# Patient Record
Sex: FEMALE | Race: WHITE | Employment: OTHER | URBAN - METROPOLITAN AREA
[De-identification: names, ages, dates, MRNs, and addresses within clinical notes are randomized per-mention and may not be internally consistent; named-entity substitution may affect disease eponyms.]

---

## 2017-04-21 ENCOUNTER — OFFICE VISIT (OUTPATIENT)
Dept: CARDIOLOGY CLINIC | Age: 72
End: 2017-04-21

## 2017-04-21 VITALS
WEIGHT: 161.2 LBS | OXYGEN SATURATION: 98 % | HEIGHT: 60 IN | DIASTOLIC BLOOD PRESSURE: 80 MMHG | BODY MASS INDEX: 31.65 KG/M2 | SYSTOLIC BLOOD PRESSURE: 132 MMHG | HEART RATE: 84 BPM

## 2017-04-21 DIAGNOSIS — R94.31 ABNORMAL EKG: Primary | ICD-10-CM

## 2017-04-21 RX ORDER — GLUCOSAMINE HCL 500 MG
TABLET ORAL
COMMUNITY

## 2017-04-21 RX ORDER — PRAVASTATIN SODIUM 20 MG/1
20 TABLET ORAL
COMMUNITY

## 2017-04-21 NOTE — PROGRESS NOTES
Herbert Davis, [address]  [address] Geoffrey Barrett, [facility], [address] [facility]    Office [phone]  Fax [phone]  Cell [phone]        Dana Marsh is a 80 y.o. female. Last seen 1 year ago by Dr. Gaye Yañez. Assessment  Encounter Diagnosis   Name Primary?  Abnormal EKG Yes     Recommendations: Dana Marsh has poor R-wave progression on EKG, normal variant, unchanged from 1 year ago - no evidence of MI. Echo last year demonstrated normal regional and global LV function. She was reassured. She has no exertional sxs at this time. Nevertheless, she is at intermediate CAD risk. We discussed the role of CT heart scan to evaluate the role for subclinical CAD. She is already on Aspirin therapy. Phone follow up after reviewing tests. Subjective:    Mrs. Gelacio Cha presents to the clinic following abnormal EKG from Dr. Haley Banks office - Maida Lopez, old anterior MI per computer interpretation. Reports few episodes of dizziness with pollen exposure but no syncopal or near syncopal episodes. She remains active walking 3 miles day. Denies any exertional symptoms. She follows a gluten-free and sugar-free diet. Patient denies any exertional chest pain, dyspnea, palpitations, syncope, orthopnea, edema or paroxysmal nocturnal dyspnea. Cardiac risk factors   HTN no  DM no  Smoking no  Family hx of CAD yes - Father with MI in his 62s. Cardiac testing  No specialty comments available. Past Medical History:   Diagnosis Date    HLD (hyperlipidemia)     HTN (hypertension)     Vitamin D deficiency         Current Outpatient Prescriptions   Medication Sig Dispense Refill    pravastatin (PRAVACHOL) 20 mg tablet Take 20 mg by mouth nightly.  Cholecalciferol, Vitamin D3, 3,000 unit tab Take  by mouth.  Cetirizine 10 mg cap Take  by mouth.  aspirin 81 mg chewable tablet Take 1 Tab by mouth daily.  30 Tab 0       No Known Allergies       Review of Systems  Constitutional: Negative for fever, chills, malaise/fatigue and diaphoresis. Respiratory: Negative for cough, hemoptysis, sputum production, shortness of breath and wheezing. Cardiovascular: Negative for chest pain, palpitations, orthopnea, claudication, leg swelling and PND. Gastrointestinal: Negative for heartburn, nausea, vomiting, blood in stool and melena. Genitourinary: Negative for dysuria and flank pain. Musculoskeletal: Negative for joint pain and back pain. Skin: Negative for rash. Neurological: Negative for focal weakness, seizures, loss of consciousness, weakness and headaches. Positive for dizziness. Endo/Heme/Allergies: Does not bruise/bleed easily. Psychiatric/Behavioral: Negative for memory loss. The patient does not have insomnia. Physical Exam    Visit Vitals    /80 (BP 1 Location: Left arm, BP Patient Position: Sitting)    Pulse 84    Ht 5' (1.524 m)    Wt 161 lb 3.2 oz (73.1 kg)    SpO2 98%    BMI 31.48 kg/m2     Wt Readings from Last 3 Encounters:   04/21/17 161 lb 3.2 oz (73.1 kg)   04/29/16 160 lb (72.6 kg)   04/25/16 157 lb (71.2 kg)      General - well developed well nourished  Neck - JVP normal, thyroid nl  Cardiac - normal S1, S2, no murmurs, rubs or gallops. No clicks  Vascular - carotids without bruits, radials, femorals and pedal pulses equal bilateral  Lungs - clear to auscultation bilaterals, no rales, wheezing or rhonchi  Abd - soft nontender, no HSM, no abd bruits  Extremities - no edema  Skin - no rash  Neuro - nonfocal  Psych - normal mood and affect      Cardiographics    Echo 05/06/16- Mild LVH, EF 65%  EKG 04/06/17- SR, poor R-wave progression, probably normal unchanged from 04/25/16.    Lab work 04/07/17- , TG 52, HDL 64, IRT176, TSH 4.53, GLU 92    Written by Walt Calderon, as dictated by Yahaira Banda MD.   Yahaira Banda MD

## 2017-04-21 NOTE — MR AVS SNAPSHOT
Visit Information Date & Time Provider Department Dept. Phone Encounter #  
 4/21/2017  3:40 PM Andrea Fuller MD [facility] Meredith Ochoa [phone] [id number] Follow-up Instructions Return if symptoms worsen or fail to improve. Upcoming Health Maintenance Date Due Hepatitis C Screening 1945 DTaP/Tdap/Td series (1 - Tdap) 10/6/1967 FOBT Q 1 YEAR AGE 50-75 10/6/1996 ZOSTER VACCINE AGE 60> 10/6/2006 GLAUCOMA SCREENING Q2Y 10/6/2011 OSTEOPOROSIS SCREENING (DEXA) 10/6/2011 Pneumococcal 65+ Low/Medium Risk (1 of 2 - PCV13) 10/6/2011 MEDICARE YEARLY EXAM 10/6/2011 BREAST CANCER SCRN MAMMOGRAM 3/27/2015 INFLUENZA AGE 9 TO ADULT 8/1/2016 Allergies as of 4/21/2017  Review Complete On: 4/21/2017 By: Alisha Harper  
 No Known Allergies Current Immunizations  Never Reviewed No immunizations on file. Not reviewed this visit You Were Diagnosed With   
  
 Codes Comments Abnormal EKG    -  Primary ICD-10-CM: R94.31 
ICD-9-CM: 794.31 Vitals BP Pulse Height(growth percentile) Weight(growth percentile) SpO2 BMI  
 132/80 (BP 1 Location: Left arm, BP Patient Position: Sitting) 84 5' (1.524 m) 161 lb 3.2 oz (73.1 kg) 98% 31.48 kg/m2 OB Status Smoking Status Menopause Never Smoker Vitals History BMI and BSA Data Body Mass Index Body Surface Area  
 31.48 kg/m 2 1.76 m 2 Preferred Pharmacy Pharmacy Name Phone [facility] [address] [place] [address] [phone] Your Updated Medication List  
  
   
This list is accurate as of: 4/21/17  3:58 PM.  Always use your most recent med list.  
  
  
  
  
 aspirin 81 mg chewable tablet Take 1 Tab by mouth daily. Cetirizine 10 mg Cap Take  by mouth. Cholecalciferol (Vitamin D3) 3,000 unit Tab Take  by mouth.  
  
 pravastatin 20 mg tablet Commonly known as:  PRAVACHOL  
 Take 20 mg by mouth nightly. Follow-up Instructions Return if symptoms worsen or fail to improve. Patient Instructions I would like to schedule a CT heart scan to look for heart artery plaque. Call [phone] to schedule this procedure. Insurance does not cover this, but it is only $100. Introducing [facility] & HEALTH SERVICES! Mayra Agee introduces [employer] patient portal. Now you can access parts of your medical record, email your doctor's office, and request medication refills online. 1. In your internet browser, go to [email or web address] [email or web address] 2. Click on the First Time User? Click Here link in the Sign In box. You will see the New Member Sign Up page. 3. Enter your [employer] Access Code exactly as it appears below. You will not need to use this code after youve completed the sign-up process. If you do not sign up before the expiration date, you must request a new code. · [employer] Access Code: [email or web address] Expires: 7/20/2017  3:58 PM 
 
4. Enter the last four digits of your Social Security Number (xxxx) and Date of Birth (mm/dd/yyyy) as indicated and click Submit. You will be taken to the next sign-up page. 5. Create a [employer] ID. This will be your [employer] login ID and cannot be changed, so think of one that is secure and easy to remember. 6. Create a [employer] password. You can change your password at any time. 7. Enter your Password Reset Question and Answer. This can be used at a later time if you forget your password. 8. Enter your e-mail address. You will receive e-mail notification when new information is available in [address] 9. Click Sign Up. You can now view and download portions of your medical record. 10. Click the Download Summary menu link to download a portable copy of your medical information. If you have questions, please visit the Frequently Asked Questions section of the [employer] website.  Remember, [employer] is NOT to be used for urgent needs. For medical emergencies, dial 911. Now available from your iPhone and Android! Please provide this summary of care documentation to your next provider. Your primary care clinician is listed as Rashid Damico. If you have any questions after today's visit, please call [phone].

## 2017-04-21 NOTE — PROGRESS NOTES
Visit Vitals    /80 (BP 1 Location: Left arm, BP Patient Position: Sitting)    Pulse 84    Ht 5' (1.524 m)    Wt 161 lb 3.2 oz (73.1 kg)    SpO2 98%    BMI 31.48 kg/m2

## 2017-04-21 NOTE — PATIENT INSTRUCTIONS
I would like to schedule a CT heart scan to look for heart artery plaque. Call [phone] to schedule this procedure. Insurance does not cover this, but it is only $100.

## 2017-04-24 ENCOUNTER — TELEPHONE (OUTPATIENT)
Dept: CARDIOLOGY CLINIC | Age: 72
End: 2017-04-24

## 2017-04-24 NOTE — TELEPHONE ENCOUNTER
Patient notified that compared to her previous EKG, there has been no changes. Reiterated that there is no evidence of MI per Dr. Kenya Garcia. She will have CT heart scan done once she returns from [place].

## 2018-07-11 NOTE — PERIOP NOTES
[address]  Endoscopy Preprocedure Instructions      1. On the day of your surgery, please report to registration located on the 2nd floor of the  [facility]. yes    2. You must have a responsible adult to drive you to the hospital, stay at the hospital during your procedure and drive you home. If they leave your procedure will not be started (no exceptions). yes and no    3. Do not have anything to eat or drink (including water, gum, mints, coffee, and juice) after midnight. This does not apply to the medications you were instructed to take by your physician. yesIf you are currently taking Plavix, Coumadin, Aspirin, or other blood-thinning agents, contact your physician for special instructions. yes,aspirin    4. If you are having a procedure that requires bowel prep: We recommend that you have only clear liquids the day before your procedure and begin your bowel prep by 5:00 pm.  You may continue to drink clear liquids until midnight. If for any reason you are not able to complete your prep please notify your physician immediately. yes    5. Have a list of all current medications, including vitamins, herbal supplements and any other over the counter medications. yes    6. If you wear glasses, contacts, dentures and/or hearing aids, they may be removed prior to procedure, please bring a case to store them in. yes    7. You should understand that if you do not follow these instructions your procedure may be cancelled. If your physical condition changes (I.e. fever, cold or flu) please contact your doctor as soon as possible. 8. It is important that you be on time.   If for any reason you are unable to keep your appointment please call )- the day of or your physicians office prior to your scheduled procedure

## 2018-07-13 ENCOUNTER — ANESTHESIA EVENT (OUTPATIENT)
Dept: ENDOSCOPY | Age: 73
End: 2018-07-13
Payer: MEDICARE

## 2018-07-13 ENCOUNTER — ANESTHESIA (OUTPATIENT)
Dept: ENDOSCOPY | Age: 73
End: 2018-07-13
Payer: MEDICARE

## 2018-07-13 ENCOUNTER — HOSPITAL ENCOUNTER (OUTPATIENT)
Age: 73
Setting detail: OUTPATIENT SURGERY
Discharge: HOME | End: 2018-07-13
Attending: INTERNAL MEDICINE | Admitting: INTERNAL MEDICINE
Payer: MEDICARE

## 2018-07-13 VITALS
RESPIRATION RATE: 15 BRPM | WEIGHT: 155 LBS | HEIGHT: 60 IN | DIASTOLIC BLOOD PRESSURE: 63 MMHG | OXYGEN SATURATION: 98 % | BODY MASS INDEX: 30.43 KG/M2 | HEART RATE: 62 BPM | TEMPERATURE: 97.5 F | SYSTOLIC BLOOD PRESSURE: 133 MMHG

## 2018-07-13 PROCEDURE — 74011250636 HC RX REV CODE- 250/636: Performed by: INTERNAL MEDICINE

## 2018-07-13 PROCEDURE — 74011250636 HC RX REV CODE- 250/636

## 2018-07-13 PROCEDURE — 76040000019: Performed by: INTERNAL MEDICINE

## 2018-07-13 PROCEDURE — 76060000031 HC ANESTHESIA FIRST 0.5 HR: Performed by: INTERNAL MEDICINE

## 2018-07-13 PROCEDURE — 88305 TISSUE EXAM BY PATHOLOGIST: CPT | Performed by: INTERNAL MEDICINE

## 2018-07-13 PROCEDURE — 77030009426 HC FCPS BIOP ENDOSC BSC -B: Performed by: INTERNAL MEDICINE

## 2018-07-13 RX ORDER — FLUMAZENIL 0.1 MG/ML
0.2 INJECTION INTRAVENOUS
Status: DISCONTINUED | OUTPATIENT
Start: 2018-07-13 | End: 2018-07-13 | Stop reason: HOSPADM

## 2018-07-13 RX ORDER — PROPOFOL 10 MG/ML
INJECTION, EMULSION INTRAVENOUS AS NEEDED
Status: DISCONTINUED | OUTPATIENT
Start: 2018-07-13 | End: 2018-07-13 | Stop reason: HOSPADM

## 2018-07-13 RX ORDER — EPINEPHRINE 0.1 MG/ML
1 INJECTION INTRACARDIAC; INTRAVENOUS
Status: DISCONTINUED | OUTPATIENT
Start: 2018-07-13 | End: 2018-07-13 | Stop reason: HOSPADM

## 2018-07-13 RX ORDER — DEXTROMETHORPHAN/PSEUDOEPHED 2.5-7.5/.8
1.2 DROPS ORAL
Status: DISCONTINUED | OUTPATIENT
Start: 2018-07-13 | End: 2018-07-13 | Stop reason: HOSPADM

## 2018-07-13 RX ORDER — ATROPINE SULFATE 0.1 MG/ML
0.4 INJECTION INTRAVENOUS
Status: DISCONTINUED | OUTPATIENT
Start: 2018-07-13 | End: 2018-07-13 | Stop reason: HOSPADM

## 2018-07-13 RX ORDER — MIDAZOLAM HYDROCHLORIDE 1 MG/ML
.25-5 INJECTION, SOLUTION INTRAMUSCULAR; INTRAVENOUS
Status: DISCONTINUED | OUTPATIENT
Start: 2018-07-13 | End: 2018-07-13 | Stop reason: HOSPADM

## 2018-07-13 RX ORDER — NALOXONE HYDROCHLORIDE 0.4 MG/ML
0.4 INJECTION, SOLUTION INTRAMUSCULAR; INTRAVENOUS; SUBCUTANEOUS
Status: DISCONTINUED | OUTPATIENT
Start: 2018-07-13 | End: 2018-07-13 | Stop reason: HOSPADM

## 2018-07-13 RX ORDER — PROPOFOL 10 MG/ML
INJECTION, EMULSION INTRAVENOUS
Status: DISCONTINUED | OUTPATIENT
Start: 2018-07-13 | End: 2018-07-13 | Stop reason: HOSPADM

## 2018-07-13 RX ORDER — SODIUM CHLORIDE 9 MG/ML
50 INJECTION, SOLUTION INTRAVENOUS CONTINUOUS
Status: DISCONTINUED | OUTPATIENT
Start: 2018-07-13 | End: 2018-07-13 | Stop reason: HOSPADM

## 2018-07-13 RX ADMIN — SODIUM CHLORIDE 50 ML/HR: 900 INJECTION, SOLUTION INTRAVENOUS at 06:34

## 2018-07-13 RX ADMIN — PROPOFOL 30 MG: 10 INJECTION, EMULSION INTRAVENOUS at 07:44

## 2018-07-13 RX ADMIN — PROPOFOL 120 MCG/KG/MIN: 10 INJECTION, EMULSION INTRAVENOUS at 07:43

## 2018-07-13 RX ADMIN — PROPOFOL 50 MG: 10 INJECTION, EMULSION INTRAVENOUS at 07:43

## 2018-07-13 NOTE — ANESTHESIA PREPROCEDURE EVALUATION
Anesthetic History   No history of anesthetic complications            Review of Systems / Medical History  Patient summary reviewed, nursing notes reviewed and pertinent labs reviewed    Pulmonary  Within defined limits                 Neuro/Psych   Within defined limits           Cardiovascular  Within defined limits  Hypertension              Exercise tolerance: >4 METS     GI/Hepatic/Renal  Within defined limits              Endo/Other  Within defined limits      Obesity     Other Findings              Physical Exam    Airway  Mallampati: II    Neck ROM: normal range of motion   Mouth opening: Normal     Cardiovascular  Regular rate and rhythm,  S1 and S2 normal,  no murmur, click, rub, or gallop  Rhythm: regular  Rate: normal         Dental  No notable dental hx       Pulmonary  Breath sounds clear to auscultation               Abdominal  GI exam deferred       Other Findings            Anesthetic Plan    ASA: 2  Anesthesia type: MAC          Induction: Intravenous  Anesthetic plan and risks discussed with: Patient

## 2018-07-13 NOTE — IP AVS SNAPSHOT
[address] 
[phone] Patient: Maryan Bruce MRN: [id number] :1945 About your hospitalization You were admitted on:  2018 You last received care in the:  [facility] ENDOSCOPY You were discharged on:  2018 Why you were hospitalized Your primary diagnosis was:  Not on File Follow-up Information None Discharge Orders None A check stuart indicates which time of day the medication should be taken. My Medications CONTINUE taking these medications Instructions Each Dose to Equal  
 Morning Noon Evening Bedtime  
 aspirin 81 mg chewable tablet Your last dose was: Your next dose is: Take 1 Tab by mouth daily. 81 mg Cetirizine 10 mg Cap Your last dose was: Your next dose is: Take 1 Cap by mouth daily. 1 Cap Cholecalciferol (Vitamin D3) 3,000 unit Tab Your last dose was: Your next dose is: Take  by mouth.  
     
   
   
   
  
 pravastatin 20 mg tablet Commonly known as:  PRAVACHOL Your last dose was: Your next dose is: Take 20 mg by mouth nightly. 20 mg Discharge Instructions [address] [facility]. [facility] Libertad Calvo M.D. 
[phone] COLON DISCHARGE INSTRUCTIONS 
    
2018 Maryan Bruce :  1945 An[place] Medical Record Number:  [id number] COLONOSCOPY FINDINGS: 
Your colonoscopy showed two diminutive polyps that were removed and sent to pathology, otherwise no lesions seen. Diverticulosis seen in the left colon. DISCOMFORT: 
Redness at IV site- apply warm compress to area; if redness or soreness persist- contact your physician There may be a slight amount of blood passed from the rectum Gaseous discomfort- walking, belching will help relieve any discomfort You may not operate a vehicle for 12 hours You may not engage in an occupation involving machinery or appliances for rest of today You may not drink alcoholic beverages for at least 12 hours Avoid making any critical decisions for at least 24 hour DIET: 
 High fiber diet.  however -  remember your colon is empty and a heavy meal will produce gas. Avoid these foods:  vegetables, fried / greasy foods, carbonated drinks for today ACTIVITY: 
You may resume your normal daily activities it is recommended that you spend the remainder of the day resting -  avoid any strenuous activity. CALL M.D. ANY SIGN OF: Increasing pain, nausea, vomiting Abdominal distension (swelling) New increased bleeding (oral or rectal) Fever (chills) Pain in chest area Bloody discharge from nose or mouth Shortness of breath Follow-up Instructions: 
 Call Dr. Robbi Loco if any questions or problems. Telephone # [phone] Biopsy results will be available in  5 to 7 days Should have a repeat colonoscopy in 5 years. Introducing \[facility]\"" & HEALTH SERVICES! [facility] introduces [employer] patient portal. Now you can access parts of your medical record, email your doctor's office, and request medication refills online. 1. In your internet browser, go to [email or web address]. [email or web address] 2. Click on the First Time User? Click Here link in the Sign In box. You will see the New Member Sign Up page. 3. Enter your [employer] Access Code exactly as it appears below. You will not need to use this code after youve completed the sign-up process. If you do not sign up before the expiration date, you must request a new code. · [employer] Access Code: [email or web address] Expires: 10/11/2018  5:54 AM 
 
4. Enter the last four digits of your Social Security Number (xxxx) and Date of Birth (mm/dd/yyyy) as indicated and click Submit.  You will be taken to the next sign-up page. 5. Create a [employer] ID. This will be your [employer] login ID and cannot be changed, so think of one that is secure and easy to remember. 6. Create a [employer] password. You can change your password at any time. 7. Enter your Password Reset Question and Answer. This can be used at a later time if you forget your password. 8. Enter your e-mail address. You will receive e-mail notification when new information is available in [address] 9. Click Sign Up. You can now view and download portions of your medical record. 10. Click the Download Summary menu link to download a portable copy of your medical information. If you have questions, please visit the Frequently Asked Questions section of the [employer] website. Remember, [employer] is NOT to be used for urgent needs. For medical emergencies, dial 911. Now available from your iPhone and Android! Introducing Kamlesh Henderson As a [employer] patient, I wanted to make you aware of our electronic visit tool called Kamlesh Henderson. [employer] 24/7 allows you to connect within minutes with a medical provider 24 hours a day, seven days a week via a mobile device or tablet or logging into a secure website from your computer. You can access Kamlesh Henderson from anywhere in the [place]. A virtual visit might be right for you when you have a simple condition and feel like you just dont want to get out of bed, or cant get away from work for an appointment, when your regular [employer] provider is not available (evenings, weekends or holidays), or when youre out of town and need minor care. Electronic visits cost only $49 and if the [employer] 24/7 provider determines a prescription is needed to treat your condition, one can be electronically transmitted to a nearby pharmacy*. Please take a moment to enroll today if you have not already done so.   The enrollment process is free and takes just a few minutes. To enroll, please download the [employer] 24/7 riley to your tablet or phone, or visit [email or web address] to enroll on your computer. And, as an [address] [place] [address] patient with a [employer] account, the results of your visits will be scanned into your electronic medical record and your primary care provider will be able to view the scanned results. We urge you to continue to see your regular K[facility] provider for your ongoing medical care. And while your primary care provider may not be the one available when you seek a [employer] virtual visit, the peace of mind you get from getting a real diagnosis real time can be priceless. For more information on [employer], view our Frequently Asked Questions (FAQs) at [email or web address]. Sincerely, 
 
Doron Vega MD 
Chief Medical Officer 50May[address] Vazquez *:  certain medications cannot be prescribed via [employer] Providers Seen During Your Hospitalization Provider Specialty Primary office phone Shakira Beach MD Gastroenterology [phone] Your Primary Care Physician (PCP) Primary Care Physician Office Phone Office Fax Malou April [phone] You are allergic to the following No active allergies Recent Documentation Height Weight Breastfeeding? BMI OB Status Smoking Status 1.524 m 70.3 kg No 30.27 kg/m2 Menopause Never Smoker Emergency Contacts Name Discharge Info Relation Home Work Mobile Michael Romo DISCHARGE CAREGIVER [3] Spouse [3] [phone] Patient Belongings The following personal items are in your possession at time of discharge: 
  Dental Appliances: None  Visual Aid: None Please provide this summary of care documentation to your next provider. Signatures-by signing, you are acknowledging that this After Visit Summary has been reviewed with you and you have received a copy. Patient Signature:  ____________________________________________________________ Date:  ____________________________________________________________  
  
Orbie German Provider Signature:  ____________________________________________________________ Date:  ____________________________________________________________

## 2018-07-13 NOTE — PERIOP NOTES
Patient tolerated procedure without problems. Abdomen soft and patient arousable and voices no complaints Report received from CRNA, see anesthesia note. Patient transported to endoscopy recovery area and verbal bedside report given to [facility] .

## 2018-07-13 NOTE — PROCEDURES
Olimpia Callejas M.D.  [phone]            2018          Colonoscopy Operative Report  Fay Diehl  :  1945  Ankit Medical Record Number:  [id number]      Indications:    Personal history of colon polyps (screening only)     :  Rc Chester MD    Referring Provider: Ana Cristina Kim MD    Sedation:  MAC anesthesia    Pre-Procedural Exam:      Airway: clear,  No airway problems anticipated  Heart: RRR, without gallops or rubs  Lungs: clear bilaterally without wheezes, crackles, or rhonchi  Abdomen: soft, nontender, nondistended, bowel sounds present  Mental Status: awake, alert and oriented to person, place and time     Procedure Details:  After informed consent was obtained with all risks and benefits of procedure explained and preoperative exam completed, the patient was taken to the endoscopy suite and placed in the left lateral decubitus position. Upon sequential sedation as per above, a digital rectal exam was performed. The Olympus videocolonoscope  was inserted in the rectum and carefully advanced to the cecum, which was identified by the ileocecal valve and appendiceal orifice. The quality of preparation was good. The colonoscope was slowly withdrawn with careful inspection and evaluation between folds. Retroflexion in the rectum was performed. Findings:   Terminal Ileum: not intubated  Cecum: normal  Ascending Colon: 2  Sessile polyp(s), the largest 3 mm in size;  Transverse Colon: normal  Descending Colon: normal  Sigmoid: no mucosal lesion appreciated  moderate diverticulosis; Rectum: no mucosal lesion appreciated  Grade 1 internal hemorrhoid(s); Interventions:  2 complete polypectomy were performed using cold biopsy forceps and the polyps were  retrieved    Specimen Removed:  specimen #1, 2 mm in size, located in the ascending colon removed by cold biopsy and sent for pathology    Complications: None. EBL:  None.     Impression: Two diminutive polyps removed and sent to pathology, otherwise mucosa within normal.                          Sigmoid diverticulosis and internal hemorrhoids    Recommendations:  -Repeat colonoscopy in 5 years.   -High fiber diet.    -Resume normal medication(s). Discharge Disposition:  Home in the company of a  when able to ambulate.     Gustavo Rojas MD  7/13/2018  7:57 AM

## 2018-07-13 NOTE — ANESTHESIA POSTPROCEDURE EVALUATION
Post-Anesthesia Evaluation and Assessment    Patient: Jie Murcia MRN: [id number]  SSN: [id number]    YOB: 1945  Age: 71 y.o. Sex: female       Cardiovascular Function/Vital Signs  Visit Vitals    /47    Pulse 61    Temp 36.4 °C (97.5 °F)    Resp 13    Ht 5' (1.524 m)    Wt 70.3 kg (155 lb)    SpO2 100%    Breastfeeding No    BMI 30.27 kg/m2       Patient is status post MAC anesthesia for Procedure(s):  COLONOSCOPY  ENDOSCOPIC POLYPECTOMY. Nausea/Vomiting: None    Postoperative hydration reviewed and adequate. Pain:  Pain Scale 1: Visual (07/13/18 0802)  Pain Intensity 1: 0 (07/13/18 0802)   Managed    Neurological Status: At baseline    Mental Status and Level of Consciousness: Arousable    Pulmonary Status:   O2 Device: Room air (07/13/18 0802)   Adequate oxygenation and airway patent    Complications related to anesthesia: None    Post-anesthesia assessment completed.  No concerns    Signed By: Edison Graham MD     July 13, 2018

## 2018-07-13 NOTE — H&P
[address] Justice Castillo M.D. 
[phone] History and Physical    
 
NAME:  Livia Severance :   1945 MRN:   [id number] Referring Physician:  Dr. Ger Allen Consult Date: 2018 7:38 AM 
 
Chief Complaint:  Colon cancer screening History of Present Illness:  Patient is a 71 y.o. who is seen for colon cancer screening and colon polyp surveillance. Denies any ongoing GI complaints. PMH: 
Past Medical History:  
Diagnosis Date  HLD (hyperlipidemia)  HTN (hypertension)  Vitamin D deficiency PSH: 
History reviewed. No pertinent surgical history. Allergies: 
No Known Allergies Home Medications: 
Prior to Admission Medications Prescriptions Last Dose Informant Patient Reported? Taking? Cetirizine 10 mg cap Not Taking at Unknown time  Yes No  
Sig: Take 1 Cap by mouth daily. Cholecalciferol, Vitamin D3, 3,000 unit tab 2018 at pm  Yes Yes Sig: Take  by mouth. aspirin 81 mg chewable tablet 2018 at pm  No Yes Sig: Take 1 Tab by mouth daily. pravastatin (PRAVACHOL) 20 mg tablet 2018 at pm  Yes Yes Sig: Take 20 mg by mouth nightly. Facility-Administered Medications: None Hospital Medications: 
Current Facility-Administered Medications Medication Dose Route Frequency  0.9% sodium chloride infusion  50 mL/hr IntraVENous CONTINUOUS  
 midazolam (VERSED) injection 0.25-5 mg  0.25-5 mg IntraVENous Multiple  naloxone (NARCAN) injection 0.4 mg  0.4 mg IntraVENous Multiple  flumazenil (ROMAZICON) 0.1 mg/mL injection 0.2 mg  0.2 mg IntraVENous Multiple  simethicone (MYLICON) 14AN/4.9YI oral drops 80 mg  1.2 mL Oral Multiple  atropine injection 0.4 mg  0.4 mg IntraVENous ONCE PRN  
 EPINEPHrine (ADRENALIN) 0.1 mg/mL syringe 1 mg  1 mg Endoscopically ONCE PRN Social History: 
Social History Substance Use Topics  Smoking status: Never Smoker  Smokeless tobacco: Never Used  Alcohol use No  
 
 
Family History: 
Family History Problem Relation Age of Onset  Cancer Mother   
  pancreatic  Heart Attack Father  Other Brother   
  brain tumor Review of Systems: 
 
 
Constitutional: negative fever, negative chills, negative weight loss Eyes:   negative visual changes ENT:   negative sore throat, tongue or lip swelling Respiratory:  negative cough, negative dyspnea Cards:  negative for chest pain, palpitations, lower extremity edema GI:   See HPI 
:  negative for frequency, dysuria Integument:  negative for rash and pruritus Heme:  negative for easy bruising and gum/nose bleeding Musculoskel: negative for myalgias,  back pain and muscle weakness Neuro: negative for headaches, dizziness, vertigo Psych:  negative for feelings of anxiety, depression Objective:  
Patient Vitals for the past 8 hrs: 
 BP Temp Pulse Resp SpO2 Height Weight  
07/13/18 0628 125/65 98.4 °F (36.9 °C) 79 14 96 % 5' (1.524 m) 70.3 kg (155 lb) EXAM:   
 NEURO-a&o HEENT-wnl LUNGS-clear COR-regular rate and rhythym ABD-soft , no tenderness, no rebound, good bs EXT-no edema Data Review No results for input(s): WBC, HGB, HCT, PLT, HGBEXT, HCTEXT, PLTEXT in the last 72 hours. No results for input(s): NA, K, CL, CO2, BUN, CREA, GLU, PHOS, CA in the last 72 hours. No results for input(s): SGOT, GPT, AP, TBIL, TP, ALB, GLOB, GGT, AML, LPSE in the last 72 hours. No lab exists for component: AMYP, HLPSE No results for input(s): INR, PTP, APTT in the last 72 hours. No lab exists for component: INREXT There is no problem list on file for this patient. Assessment:  
· Colon cancer screening Plan:  
· Colonoscopy today.   
 
Signed By: Hanane Hayes MD   
 7/13/2018  7:38 AM

## 2018-07-13 NOTE — PROGRESS NOTES
Mary Echavarria  1945  [id number]    Situation:  Verbal report received from:   Waldo Schmidt RN  Procedure: Procedure(s):  COLONOSCOPY  ENDOSCOPIC POLYPECTOMY    Background:    Preoperative diagnosis: HISTORY COLON POLYPS  Postoperative diagnosis: diverticulosis, hemorrhoids, ascending colon polyps    :  Dr. Carmen Atwood  Assistant(s): Endoscopy Technician-1: Meera Daly  Endoscopy RN-1: Austin Piedra RN    Specimens:   ID Type Source Tests Collected by Time Destination   1 : ascending colon polyps Preservative Colon, Ascending  Theo Schulz MD 7/13/2018 0750 Pathology     H. Pylori  no    Assessment:  Intra-procedure medications   V   Anesthesia gave intra-procedure sedation and medications, see anesthesia flow sheet yes    Intravenous fluids: NS@ KVO     Vital signs stable   yes    Abdominal assessment: round and soft   yes    Recommendation:  Discharge patient per MD order  yes.   Return to floor  outpatient  Family or Friend   spouse  Permission to share finding with family or friend yes

## 2018-07-13 NOTE — DISCHARGE INSTRUCTIONS
[address] [facility]. Agusto Shaffer M.D.  [phone]            COLON DISCHARGE INSTRUCTIONS       2018    Suze Fontaine  :  1945  [facility]kit Medical Record Number:  [id number]      COLONOSCOPY FINDINGS:  Your colonoscopy showed two diminutive polyps that were removed and sent to pathology, otherwise no lesions seen. Diverticulosis seen in the left colon. DISCOMFORT:  Redness at IV site- apply warm compress to area; if redness or soreness persist- contact your physician  There may be a slight amount of blood passed from the rectum  Gaseous discomfort- walking, belching will help relieve any discomfort  You may not operate a vehicle for 12 hours  You may not engage in an occupation involving machinery or appliances for rest of today  You may not drink alcoholic beverages for at least 12 hours  Avoid making any critical decisions for at least 24 hour  DIET:   High fiber diet. - however -  remember your colon is empty and a heavy meal will produce gas. Avoid these foods:  vegetables, fried / greasy foods, carbonated drinks for today     ACTIVITY:  You may resume your normal daily activities it is recommended that you spend the remainder of the day resting -  avoid any strenuous activity. CALL M.D. ANY SIGN OF:   Increasing pain, nausea, vomiting  Abdominal distension (swelling)  New increased bleeding (oral or rectal)  Fever (chills)  Pain in chest area  Bloody discharge from nose or mouth   Shortness of breath    Follow-up Instructions:   Call Dr. Carla Kelly if any questions or problems. Telephone # [phone]  Biopsy results will be available in  5 to 7 days  Should have a repeat colonoscopy in 5 years.

## (undated) DEVICE — SOLIDIFIER MEDC 1200ML -- CONVERT TO 356117

## (undated) DEVICE — FORCEPS BX L240CM JAW DIA2.8MM L CAP W/ NDL MIC MESH TOOTH

## (undated) DEVICE — BAG SPEC BIOHZRD 10 X 10 IN --

## (undated) DEVICE — NDL FLTR TIP 5 MIC 18GX1.5IN --

## (undated) DEVICE — SYR 5ML 1/5 GRAD LL NSAF LF --

## (undated) DEVICE — KIT COLON W/ 1.1OZ LUB AND 2 END

## (undated) DEVICE — CANN NASAL O2 CAPNOGRAPHY AD -- FILTERLINE

## (undated) DEVICE — CUFF ADULT 1 PC 1 VINYL DISP --

## (undated) DEVICE — CONTAINER SPEC 20 ML LID NEUT BUFF FORMALIN 10 % POLYPR STS

## (undated) DEVICE — (D)SENSOR RMFG 02 PULS OXMTR -- DISC BY MFR USE ITEM 133445

## (undated) DEVICE — 1200 GUARD II KIT W/5MM TUBE W/O VAC TUBE: Brand: GUARDIAN

## (undated) DEVICE — SYR 3ML LL TIP 1/10ML GRAD --

## (undated) DEVICE — BAG BELONG PT PERS CLEAR HANDL

## (undated) DEVICE — KENDALL RADIOLUCENT FOAM MONITORING ELECTRODE -RECTANGULAR SHAPE: Brand: KENDALL

## (undated) DEVICE — Device

## (undated) DEVICE — NDL PRT INJ NSAF BLNT 18GX1.5 --

## (undated) DEVICE — BASIN EMSIS 16OZ GRAPHITE PLAS KID SHP MOLD GRAD FOR ORAL